# Patient Record
Sex: MALE | Race: OTHER | Employment: FULL TIME
[De-identification: names, ages, dates, MRNs, and addresses within clinical notes are randomized per-mention and may not be internally consistent; named-entity substitution may affect disease eponyms.]

---

## 2024-11-06 ENCOUNTER — HOSPITAL ENCOUNTER (OUTPATIENT)
Facility: HOSPITAL | Age: 34
Discharge: HOME | End: 2024-11-09
Payer: COMMERCIAL

## 2024-11-06 ENCOUNTER — TRANSCRIBE ORDERS (OUTPATIENT)
Facility: HOSPITAL | Age: 34
End: 2024-11-06

## 2024-11-06 DIAGNOSIS — Z76.89 ENCOUNTER TO ESTABLISH CARE: ICD-10-CM

## 2024-11-06 DIAGNOSIS — Z76.89 ENCOUNTER TO ESTABLISH CARE: Primary | ICD-10-CM

## 2024-11-06 PROCEDURE — 73130 X-RAY EXAM OF HAND: CPT

## 2025-06-30 ENCOUNTER — OFFICE VISIT (OUTPATIENT)
Age: 35
End: 2025-06-30
Payer: COMMERCIAL

## 2025-06-30 VITALS
SYSTOLIC BLOOD PRESSURE: 132 MMHG | TEMPERATURE: 97.7 F | DIASTOLIC BLOOD PRESSURE: 90 MMHG | HEART RATE: 83 BPM | BODY MASS INDEX: 36.19 KG/M2 | OXYGEN SATURATION: 94 % | HEIGHT: 76 IN | WEIGHT: 297.2 LBS

## 2025-06-30 DIAGNOSIS — G47.33 OSA (OBSTRUCTIVE SLEEP APNEA): Primary | ICD-10-CM

## 2025-06-30 PROCEDURE — 99203 OFFICE O/P NEW LOW 30 MIN: CPT | Performed by: INTERNAL MEDICINE

## 2025-06-30 ASSESSMENT — SLEEP AND FATIGUE QUESTIONNAIRES
ESS TOTAL SCORE: 17
HOW LIKELY ARE YOU TO NOD OFF OR FALL ASLEEP WHILE LYING DOWN TO REST IN THE AFTERNOON WHEN CIRCUMSTANCES PERMIT: HIGH CHANCE OF DOZING
HOW LIKELY ARE YOU TO NOD OFF OR FALL ASLEEP WHILE SITTING AND TALKING TO SOMEONE: WOULD NEVER DOZE
HOW LIKELY ARE YOU TO NOD OFF OR FALL ASLEEP WHEN YOU ARE A PASSENGER IN A CAR FOR AN HOUR WITHOUT A BREAK: HIGH CHANCE OF DOZING
HOW LIKELY ARE YOU TO NOD OFF OR FALL ASLEEP IN A CAR, WHILE STOPPED FOR A FEW MINUTES IN TRAFFIC: WOULD NEVER DOZE
HOW LIKELY ARE YOU TO NOD OFF OR FALL ASLEEP WHILE SITTING AND READING: HIGH CHANCE OF DOZING
HOW LIKELY ARE YOU TO NOD OFF OR FALL ASLEEP WHILE SITTING QUIETLY AFTER LUNCH WITHOUT ALCOHOL: MODERATE CHANCE OF DOZING
HOW LIKELY ARE YOU TO NOD OFF OR FALL ASLEEP WHILE WATCHING TV: HIGH CHANCE OF DOZING
HOW LIKELY ARE YOU TO NOD OFF OR FALL ASLEEP WHILE SITTING INACTIVE IN A PUBLIC PLACE: HIGH CHANCE OF DOZING

## 2025-06-30 NOTE — PROGRESS NOTES
[address], [place] [address]  Tel.  [phone]    Fax. [phone]     [address], [place] [address]  Tel.  [phone]    Fax. [phone] [address] [place] [address]  Tel.  [phone]    Fax. [phone]       Eliseo Hess is a 34 y.o. year old male seen for evaluation of a sleep disorder.  # Carlos Carr [id number]      ASSESSMENT/PLAN:     Diagnosis Orders   1. MARIA ELENA (obstructive sleep apnea)  DME Order for (Specify) as OP      2. BMI 36.0-36.9,adult            Patient has a history and examination consistent with the diagnosis of sleep apnea.    Return for follow-up after testing is completed.    * The patient currently has a High Risk for having sleep apnea.  STOP-BANG score 6.    * Sleep testing was ordered for initial evaluation.      Orders Placed This Encounter   Procedures    DME Order for (Specify) as OP     - DME device ordered - ResMed Device with Heated Humidifer  / .   - Diagnosis: Obstructive Sleep Apnea (G47.33)  - Length of Need: Lifetime    Pressure Settin - 15 cmH2O     Nasal Cushion (Replace) 2 per month.     Nasal Interface Mask 1 every 3 months.    Headgear 1 every 6 months.     Filter(s) Disposable 2 per month.   Filter(s) Non-Disposable 1 every 6 months.      Water Chamber for Humidifier (Replace) 1 every 6 months.   Tubing with heating element 1 every 3 months.    Perform Mask Fitting per patient preference and comfort - replace as above.      Graciela Salguero MD, FAASM; NPI: [id number]  Electronically signed. 2025       * He was provided information on sleep apnea including corresponding risk factors and the importance of proper treatment.     * Treatment options were reviewed in detail. he would like to proceed with PAP therapy. Patient will be seen in follow-up

## 2025-06-30 NOTE — CONSULTS
Session ID: [id number]  Session Duration: Longer than 54 minutes  Language: Portuguese   ID: #[id number]   Name: Carlos Carr

## 2025-07-23 ENCOUNTER — PROCEDURE VISIT (OUTPATIENT)
Age: 35
End: 2025-07-23

## 2025-07-23 ENCOUNTER — TELEPHONE (OUTPATIENT)
Age: 35
End: 2025-07-23

## 2025-07-23 DIAGNOSIS — G47.33 OSA (OBSTRUCTIVE SLEEP APNEA): Primary | ICD-10-CM

## 2025-07-23 NOTE — PROGRESS NOTES
[address]  [place] [address]  Tel.  [phone]  Fax. [phone] [address]  [place] [address]  Tel.  [phone]  Fax. [phone] [address] [place] [address]  Tel.  [phone]  Fax. [phone]       Eliseo Hess is seen today to receive a WatchPAT home sleep apnea testing (HSAT) device. Set up was done with  service.  #[id number]      The WatchPAT HSAT Agreement was reviewed and endorsed by patient.  General information regarding operation of the HSAT device was provided.  Patient viewed the WatchPAT instructional video while in the clinic.  Patient was advised to contact patient support for any problems using the device.  Patient was advised to complete the Morning Questionnaire after awakening/ending the test.    There were no vitals taken for this visit.    Patient will return the HSAT device by noon unless otherwise approved.  Patient will be contacted with results once the study has been reviewed by the physician, typically within 15 business days.    [employer] Serial Number [id number]  Results review scheduled For 8/7/2025 at 3:00 PM

## 2025-07-24 ENCOUNTER — HOSPITAL ENCOUNTER (OUTPATIENT)
Facility: HOSPITAL | Age: 35
Discharge: HOME | End: 2025-07-27
Payer: COMMERCIAL

## 2025-07-24 PROCEDURE — 95800 SLP STDY UNATTENDED: CPT | Performed by: INTERNAL MEDICINE

## 2025-07-28 ENCOUNTER — CLINICAL DOCUMENTATION (OUTPATIENT)
Age: 35
End: 2025-07-28

## 2025-07-28 DIAGNOSIS — G47.33 OSA (OBSTRUCTIVE SLEEP APNEA): Primary | ICD-10-CM

## 2025-07-29 NOTE — PROGRESS NOTES
Eliseo Hess is to be contacted by sleep technologists regarding results of WatchPAT Testing which was indicative of an average pAHI 3% of 68.4 and pAHI 4% of 61.5 per hour.  SpO2 darron was 51% and SpO2 of < 88% was 98.5 minutes.        An APAP prescription has been written and patient will be contacted by office staff regarding follow-up  in 2-3 months after initiation of therapy.    Encounter Diagnosis   Name Primary?    MARIA ELENA (obstructive sleep apnea) Yes       Orders Placed This Encounter   Procedures    DME Order for (Specify) as OP     - DME device ordered - ResMed Device with Heated Humidifer  / .   - Diagnosis: Obstructive Sleep Apnea (G47.33)  - Length of Need: Lifetime    Pressure Settin - 15 cmH2O     Nasal Cushion (Replace) 2 per month.     Nasal Interface Mask 1 every 3 months.    Headgear 1 every 6 months.     Filter(s) Disposable 2 per month.   Filter(s) Non-Disposable 1 every 6 months.      Water Chamber for Humidifier (Replace) 1 every 6 months.   Tubing with heating element 1 every 3 months.    Perform Mask Fitting per patient preference and comfort - replace as above.      Graciela Salguero MD, FAASM; NPI: [id number]  Electronically signed. 2025